# Patient Record
Sex: MALE | Race: WHITE | NOT HISPANIC OR LATINO | Employment: FULL TIME | URBAN - METROPOLITAN AREA
[De-identification: names, ages, dates, MRNs, and addresses within clinical notes are randomized per-mention and may not be internally consistent; named-entity substitution may affect disease eponyms.]

---

## 2019-09-04 ENCOUNTER — RECORDS - HEALTHEAST (OUTPATIENT)
Dept: LAB | Facility: CLINIC | Age: 49
End: 2019-09-04

## 2019-09-04 LAB
ALBUMIN SERPL-MCNC: 4 G/DL (ref 3.5–5)
ALP SERPL-CCNC: 83 U/L (ref 45–120)
ALT SERPL W P-5'-P-CCNC: 52 U/L (ref 0–45)
ANION GAP SERPL CALCULATED.3IONS-SCNC: 11 MMOL/L (ref 5–18)
AST SERPL W P-5'-P-CCNC: 36 U/L (ref 0–40)
BILIRUB SERPL-MCNC: 0.7 MG/DL (ref 0–1)
BUN SERPL-MCNC: 8 MG/DL (ref 8–22)
CALCIUM SERPL-MCNC: 9.4 MG/DL (ref 8.5–10.5)
CHLORIDE BLD-SCNC: 106 MMOL/L (ref 98–107)
CHOLEST SERPL-MCNC: 166 MG/DL
CO2 SERPL-SCNC: 27 MMOL/L (ref 22–31)
CREAT SERPL-MCNC: 0.92 MG/DL (ref 0.7–1.3)
FASTING STATUS PATIENT QL REPORTED: NORMAL
GFR SERPL CREATININE-BSD FRML MDRD: >60 ML/MIN/1.73M2
GLUCOSE BLD-MCNC: 160 MG/DL (ref 70–125)
HDLC SERPL-MCNC: 48 MG/DL
LDLC SERPL CALC-MCNC: 99 MG/DL
POTASSIUM BLD-SCNC: 4.1 MMOL/L (ref 3.5–5)
PROT SERPL-MCNC: 7.2 G/DL (ref 6–8)
SODIUM SERPL-SCNC: 144 MMOL/L (ref 136–145)
TRIGL SERPL-MCNC: 94 MG/DL

## 2020-04-10 ENCOUNTER — RECORDS - HEALTHEAST (OUTPATIENT)
Dept: LAB | Facility: CLINIC | Age: 50
End: 2020-04-10

## 2020-04-10 LAB
ALBUMIN SERPL-MCNC: 4 G/DL (ref 3.5–5)
ALP SERPL-CCNC: 89 U/L (ref 45–120)
ALT SERPL W P-5'-P-CCNC: 79 U/L (ref 0–45)
ANION GAP SERPL CALCULATED.3IONS-SCNC: 9 MMOL/L (ref 5–18)
AST SERPL W P-5'-P-CCNC: 63 U/L (ref 0–40)
BILIRUB SERPL-MCNC: 0.8 MG/DL (ref 0–1)
BUN SERPL-MCNC: 10 MG/DL (ref 8–22)
CALCIUM SERPL-MCNC: 9.4 MG/DL (ref 8.5–10.5)
CHLORIDE BLD-SCNC: 104 MMOL/L (ref 98–107)
CHOLEST SERPL-MCNC: 184 MG/DL
CO2 SERPL-SCNC: 30 MMOL/L (ref 22–31)
CREAT SERPL-MCNC: 0.86 MG/DL (ref 0.7–1.3)
FASTING STATUS PATIENT QL REPORTED: YES
GFR SERPL CREATININE-BSD FRML MDRD: >60 ML/MIN/1.73M2
GLUCOSE BLD-MCNC: 181 MG/DL (ref 70–125)
HDLC SERPL-MCNC: 54 MG/DL
LDLC SERPL CALC-MCNC: 108 MG/DL
POTASSIUM BLD-SCNC: 3.8 MMOL/L (ref 3.5–5)
PROT SERPL-MCNC: 7.5 G/DL (ref 6–8)
SODIUM SERPL-SCNC: 143 MMOL/L (ref 136–145)
TRIGL SERPL-MCNC: 112 MG/DL

## 2020-05-15 ENCOUNTER — RECORDS - HEALTHEAST (OUTPATIENT)
Dept: LAB | Facility: CLINIC | Age: 50
End: 2020-05-15

## 2020-05-15 LAB
ALBUMIN SERPL-MCNC: 4.2 G/DL (ref 3.5–5)
ANION GAP SERPL CALCULATED.3IONS-SCNC: 12 MMOL/L (ref 5–18)
BUN SERPL-MCNC: 11 MG/DL (ref 8–22)
CALCIUM SERPL-MCNC: 9.9 MG/DL (ref 8.5–10.5)
CHLORIDE BLD-SCNC: 103 MMOL/L (ref 98–107)
CO2 SERPL-SCNC: 28 MMOL/L (ref 22–31)
CREAT SERPL-MCNC: 1.01 MG/DL (ref 0.7–1.3)
GFR SERPL CREATININE-BSD FRML MDRD: >60 ML/MIN/1.73M2
GLUCOSE BLD-MCNC: 177 MG/DL (ref 70–125)
PHOSPHATE SERPL-MCNC: 3.1 MG/DL (ref 2.5–4.5)
POTASSIUM BLD-SCNC: 3.8 MMOL/L (ref 3.5–5)
SODIUM SERPL-SCNC: 143 MMOL/L (ref 136–145)

## 2020-05-16 LAB — BACTERIA SPEC CULT: NO GROWTH

## 2021-05-26 ENCOUNTER — RECORDS - HEALTHEAST (OUTPATIENT)
Dept: ADMINISTRATIVE | Facility: CLINIC | Age: 51
End: 2021-05-26

## 2021-08-27 ENCOUNTER — LAB REQUISITION (OUTPATIENT)
Dept: LAB | Facility: CLINIC | Age: 51
End: 2021-08-27

## 2021-08-27 DIAGNOSIS — I10 ESSENTIAL (PRIMARY) HYPERTENSION: ICD-10-CM

## 2021-08-27 DIAGNOSIS — E11.9 TYPE 2 DIABETES MELLITUS WITHOUT COMPLICATIONS (H): ICD-10-CM

## 2021-08-27 LAB
ALBUMIN SERPL-MCNC: 4.2 G/DL (ref 3.5–5)
ALP SERPL-CCNC: 97 U/L (ref 45–120)
ALT SERPL W P-5'-P-CCNC: 48 U/L (ref 0–45)
ANION GAP SERPL CALCULATED.3IONS-SCNC: 11 MMOL/L (ref 5–18)
AST SERPL W P-5'-P-CCNC: 36 U/L (ref 0–40)
BILIRUB SERPL-MCNC: 0.8 MG/DL (ref 0–1)
BUN SERPL-MCNC: 7 MG/DL (ref 8–22)
CALCIUM SERPL-MCNC: 10.2 MG/DL (ref 8.5–10.5)
CHLORIDE BLD-SCNC: 103 MMOL/L (ref 98–107)
CHOLEST SERPL-MCNC: 154 MG/DL
CO2 SERPL-SCNC: 29 MMOL/L (ref 22–31)
CREAT SERPL-MCNC: 0.92 MG/DL (ref 0.7–1.3)
GFR SERPL CREATININE-BSD FRML MDRD: >90 ML/MIN/1.73M2
GLUCOSE BLD-MCNC: 166 MG/DL (ref 70–125)
HDLC SERPL-MCNC: 50 MG/DL
LDLC SERPL CALC-MCNC: 62 MG/DL
POTASSIUM BLD-SCNC: 3.9 MMOL/L (ref 3.5–5)
PROT SERPL-MCNC: 7.7 G/DL (ref 6–8)
SODIUM SERPL-SCNC: 143 MMOL/L (ref 136–145)
TRIGL SERPL-MCNC: 208 MG/DL

## 2021-08-27 PROCEDURE — 80061 LIPID PANEL: CPT | Performed by: FAMILY MEDICINE

## 2021-08-27 PROCEDURE — 80053 COMPREHEN METABOLIC PANEL: CPT | Performed by: FAMILY MEDICINE

## 2022-02-18 ENCOUNTER — LAB REQUISITION (OUTPATIENT)
Dept: LAB | Facility: CLINIC | Age: 52
End: 2022-02-18

## 2022-02-18 DIAGNOSIS — Z12.5 ENCOUNTER FOR SCREENING FOR MALIGNANT NEOPLASM OF PROSTATE: ICD-10-CM

## 2022-02-18 DIAGNOSIS — I10 ESSENTIAL (PRIMARY) HYPERTENSION: ICD-10-CM

## 2022-02-18 LAB
ALBUMIN SERPL-MCNC: 4.3 G/DL (ref 3.5–5)
ALP SERPL-CCNC: 96 U/L (ref 45–120)
ALT SERPL W P-5'-P-CCNC: 55 U/L (ref 0–45)
ANION GAP SERPL CALCULATED.3IONS-SCNC: 12 MMOL/L (ref 5–18)
AST SERPL W P-5'-P-CCNC: 39 U/L (ref 0–40)
BILIRUB SERPL-MCNC: 0.9 MG/DL (ref 0–1)
BUN SERPL-MCNC: 8 MG/DL (ref 8–22)
CALCIUM SERPL-MCNC: 9.8 MG/DL (ref 8.5–10.5)
CHLORIDE BLD-SCNC: 104 MMOL/L (ref 98–107)
CO2 SERPL-SCNC: 26 MMOL/L (ref 22–31)
CREAT SERPL-MCNC: 0.83 MG/DL (ref 0.7–1.3)
GFR SERPL CREATININE-BSD FRML MDRD: >90 ML/MIN/1.73M2
GLUCOSE BLD-MCNC: 124 MG/DL (ref 70–125)
POTASSIUM BLD-SCNC: 3.6 MMOL/L (ref 3.5–5)
PROT SERPL-MCNC: 7.6 G/DL (ref 6–8)
SODIUM SERPL-SCNC: 142 MMOL/L (ref 136–145)

## 2022-02-18 PROCEDURE — G0103 PSA SCREENING: HCPCS | Performed by: FAMILY MEDICINE

## 2022-02-18 PROCEDURE — 80053 COMPREHEN METABOLIC PANEL: CPT | Performed by: FAMILY MEDICINE

## 2022-02-19 LAB — PSA SERPL-MCNC: 0.29 UG/L (ref 0–3.5)

## 2022-08-26 ENCOUNTER — LAB REQUISITION (OUTPATIENT)
Dept: LAB | Facility: CLINIC | Age: 52
End: 2022-08-26

## 2022-08-26 DIAGNOSIS — E11.9 TYPE 2 DIABETES MELLITUS WITHOUT COMPLICATIONS (H): ICD-10-CM

## 2022-08-26 DIAGNOSIS — I10 ESSENTIAL (PRIMARY) HYPERTENSION: ICD-10-CM

## 2022-08-26 LAB
ALBUMIN SERPL BCG-MCNC: 4.5 G/DL (ref 3.5–5.2)
ALP SERPL-CCNC: 92 U/L (ref 40–129)
ALT SERPL W P-5'-P-CCNC: 61 U/L (ref 10–50)
ANION GAP SERPL CALCULATED.3IONS-SCNC: 12 MMOL/L (ref 7–15)
AST SERPL W P-5'-P-CCNC: 57 U/L (ref 10–50)
BILIRUB SERPL-MCNC: 0.5 MG/DL
BUN SERPL-MCNC: 9 MG/DL (ref 6–20)
CALCIUM SERPL-MCNC: 10.3 MG/DL (ref 8.6–10)
CHLORIDE SERPL-SCNC: 101 MMOL/L (ref 98–107)
CHOLEST SERPL-MCNC: 136 MG/DL
CREAT SERPL-MCNC: 0.79 MG/DL (ref 0.67–1.17)
DEPRECATED HCO3 PLAS-SCNC: 28 MMOL/L (ref 22–29)
GFR SERPL CREATININE-BSD FRML MDRD: >90 ML/MIN/1.73M2
GLUCOSE SERPL-MCNC: 147 MG/DL (ref 70–99)
HDLC SERPL-MCNC: 45 MG/DL
LDLC SERPL CALC-MCNC: 45 MG/DL
NONHDLC SERPL-MCNC: 91 MG/DL
POTASSIUM SERPL-SCNC: 3.7 MMOL/L (ref 3.4–5.3)
PROT SERPL-MCNC: 7.3 G/DL (ref 6.4–8.3)
SODIUM SERPL-SCNC: 141 MMOL/L (ref 136–145)
TRIGL SERPL-MCNC: 228 MG/DL

## 2022-08-26 PROCEDURE — 80061 LIPID PANEL: CPT | Performed by: FAMILY MEDICINE

## 2022-08-26 PROCEDURE — 80053 COMPREHEN METABOLIC PANEL: CPT | Performed by: FAMILY MEDICINE

## 2022-09-30 ENCOUNTER — LAB REQUISITION (OUTPATIENT)
Dept: LAB | Facility: CLINIC | Age: 52
End: 2022-09-30

## 2022-09-30 DIAGNOSIS — I10 ESSENTIAL (PRIMARY) HYPERTENSION: ICD-10-CM

## 2022-09-30 LAB
ALBUMIN SERPL BCG-MCNC: 4.6 G/DL (ref 3.5–5.2)
ALP SERPL-CCNC: 88 U/L (ref 40–129)
ALT SERPL W P-5'-P-CCNC: 68 U/L (ref 10–50)
ANION GAP SERPL CALCULATED.3IONS-SCNC: 13 MMOL/L (ref 7–15)
AST SERPL W P-5'-P-CCNC: 51 U/L (ref 10–50)
BILIRUB SERPL-MCNC: 0.7 MG/DL
BUN SERPL-MCNC: 11.4 MG/DL (ref 6–20)
CALCIUM SERPL-MCNC: 10.3 MG/DL (ref 8.6–10)
CHLORIDE SERPL-SCNC: 101 MMOL/L (ref 98–107)
CREAT SERPL-MCNC: 0.89 MG/DL (ref 0.67–1.17)
DEPRECATED HCO3 PLAS-SCNC: 28 MMOL/L (ref 22–29)
GFR SERPL CREATININE-BSD FRML MDRD: >90 ML/MIN/1.73M2
GLUCOSE SERPL-MCNC: 111 MG/DL (ref 70–99)
POTASSIUM SERPL-SCNC: 3.5 MMOL/L (ref 3.4–5.3)
PROT SERPL-MCNC: 7.4 G/DL (ref 6.4–8.3)
SODIUM SERPL-SCNC: 142 MMOL/L (ref 136–145)

## 2022-09-30 PROCEDURE — 80053 COMPREHEN METABOLIC PANEL: CPT | Performed by: FAMILY MEDICINE

## 2023-07-14 ENCOUNTER — LAB REQUISITION (OUTPATIENT)
Dept: LAB | Facility: CLINIC | Age: 53
End: 2023-07-14

## 2023-07-14 DIAGNOSIS — E11.65 TYPE 2 DIABETES MELLITUS WITH HYPERGLYCEMIA (H): ICD-10-CM

## 2023-07-14 DIAGNOSIS — I10 ESSENTIAL (PRIMARY) HYPERTENSION: ICD-10-CM

## 2023-07-14 LAB
ALBUMIN SERPL BCG-MCNC: 4.6 G/DL (ref 3.5–5.2)
ALP SERPL-CCNC: 98 U/L (ref 40–129)
ALT SERPL W P-5'-P-CCNC: 68 U/L (ref 0–70)
ANION GAP SERPL CALCULATED.3IONS-SCNC: 14 MMOL/L (ref 7–15)
AST SERPL W P-5'-P-CCNC: 59 U/L (ref 0–45)
BILIRUB SERPL-MCNC: 0.6 MG/DL
BUN SERPL-MCNC: 11.2 MG/DL (ref 6–20)
CALCIUM SERPL-MCNC: 10.1 MG/DL (ref 8.6–10)
CHLORIDE SERPL-SCNC: 102 MMOL/L (ref 98–107)
CHOLEST SERPL-MCNC: 157 MG/DL
CREAT SERPL-MCNC: 0.93 MG/DL (ref 0.67–1.17)
CREAT UR-MCNC: 190 MG/DL
DEPRECATED HCO3 PLAS-SCNC: 27 MMOL/L (ref 22–29)
GFR SERPL CREATININE-BSD FRML MDRD: >90 ML/MIN/1.73M2
GLUCOSE SERPL-MCNC: 144 MG/DL (ref 70–99)
HDLC SERPL-MCNC: 50 MG/DL
LDLC SERPL CALC-MCNC: 63 MG/DL
MICROALBUMIN UR-MCNC: 58 MG/L
MICROALBUMIN/CREAT UR: 30.53 MG/G CR (ref 0–17)
NONHDLC SERPL-MCNC: 107 MG/DL
POTASSIUM SERPL-SCNC: 3.6 MMOL/L (ref 3.4–5.3)
PROT SERPL-MCNC: 7.6 G/DL (ref 6.4–8.3)
SODIUM SERPL-SCNC: 143 MMOL/L (ref 136–145)
TRIGL SERPL-MCNC: 218 MG/DL

## 2023-07-14 PROCEDURE — 80053 COMPREHEN METABOLIC PANEL: CPT | Performed by: STUDENT IN AN ORGANIZED HEALTH CARE EDUCATION/TRAINING PROGRAM

## 2023-07-14 PROCEDURE — 80061 LIPID PANEL: CPT | Performed by: STUDENT IN AN ORGANIZED HEALTH CARE EDUCATION/TRAINING PROGRAM

## 2023-07-14 PROCEDURE — 82570 ASSAY OF URINE CREATININE: CPT | Performed by: STUDENT IN AN ORGANIZED HEALTH CARE EDUCATION/TRAINING PROGRAM

## 2024-01-12 ENCOUNTER — APPOINTMENT (OUTPATIENT)
Dept: CT IMAGING | Facility: HOSPITAL | Age: 54
End: 2024-01-12
Attending: STUDENT IN AN ORGANIZED HEALTH CARE EDUCATION/TRAINING PROGRAM
Payer: COMMERCIAL

## 2024-01-12 ENCOUNTER — HOSPITAL ENCOUNTER (INPATIENT)
Facility: HOSPITAL | Age: 54
LOS: 1 days | Discharge: HOME | End: 2024-01-13
Attending: STUDENT IN AN ORGANIZED HEALTH CARE EDUCATION/TRAINING PROGRAM | Admitting: INTERNAL MEDICINE
Payer: COMMERCIAL

## 2024-01-12 ENCOUNTER — TRANSFERRED RECORDS (OUTPATIENT)
Dept: HEALTH INFORMATION MANAGEMENT | Facility: CLINIC | Age: 54
End: 2024-01-12

## 2024-01-12 DIAGNOSIS — I10 BENIGN ESSENTIAL HYPERTENSION: ICD-10-CM

## 2024-01-12 DIAGNOSIS — E11.9 TYPE 2 DIABETES MELLITUS WITHOUT COMPLICATION, WITHOUT LONG-TERM CURRENT USE OF INSULIN (H): Primary | ICD-10-CM

## 2024-01-12 DIAGNOSIS — G47.33 OSA (OBSTRUCTIVE SLEEP APNEA): ICD-10-CM

## 2024-01-12 DIAGNOSIS — G45.9 TIA (TRANSIENT ISCHEMIC ATTACK): ICD-10-CM

## 2024-01-12 DIAGNOSIS — R29.90 STROKE-LIKE SYMPTOMS: ICD-10-CM

## 2024-01-12 PROBLEM — K76.0 FATTY LIVER: Status: ACTIVE | Noted: 2024-01-12

## 2024-01-12 PROBLEM — E66.9 OBESITY: Status: ACTIVE | Noted: 2024-01-12

## 2024-01-12 PROBLEM — E78.5 HYPERLIPIDEMIA LDL GOAL <70: Status: ACTIVE | Noted: 2024-01-12

## 2024-01-12 LAB
ANION GAP SERPL CALCULATED.3IONS-SCNC: 10 MMOL/L (ref 7–15)
APTT PPP: 29 SECONDS (ref 22–38)
BASOPHILS # BLD AUTO: 0.1 10E3/UL (ref 0–0.2)
BASOPHILS NFR BLD AUTO: 1 %
BUN SERPL-MCNC: 11.4 MG/DL (ref 6–20)
CALCIUM SERPL-MCNC: 9.8 MG/DL (ref 8.6–10)
CHLORIDE SERPL-SCNC: 103 MMOL/L (ref 98–107)
CHOLEST SERPL-MCNC: 157 MG/DL
CREAT SERPL-MCNC: 0.8 MG/DL (ref 0.67–1.17)
DEPRECATED HCO3 PLAS-SCNC: 28 MMOL/L (ref 22–29)
EGFRCR SERPLBLD CKD-EPI 2021: >90 ML/MIN/1.73M2
EOSINOPHIL # BLD AUTO: 0.2 10E3/UL (ref 0–0.7)
EOSINOPHIL NFR BLD AUTO: 2 %
ERYTHROCYTE [DISTWIDTH] IN BLOOD BY AUTOMATED COUNT: 13 % (ref 10–15)
GLUCOSE BLDC GLUCOMTR-MCNC: 198 MG/DL (ref 70–99)
GLUCOSE BLDC GLUCOMTR-MCNC: 226 MG/DL (ref 70–99)
GLUCOSE SERPL-MCNC: 246 MG/DL (ref 70–99)
HBA1C MFR BLD: 8.3 %
HCT VFR BLD AUTO: 46.7 % (ref 40–53)
HDLC SERPL-MCNC: 47 MG/DL
HGB BLD-MCNC: 16.4 G/DL (ref 13.3–17.7)
HOLD SPECIMEN: NORMAL
IMM GRANULOCYTES # BLD: 0 10E3/UL
IMM GRANULOCYTES NFR BLD: 0 %
INR PPP: 0.97 (ref 0.85–1.15)
LDLC SERPL CALC-MCNC: 56 MG/DL
LYMPHOCYTES # BLD AUTO: 2.7 10E3/UL (ref 0.8–5.3)
LYMPHOCYTES NFR BLD AUTO: 29 %
MCH RBC QN AUTO: 31.4 PG (ref 26.5–33)
MCHC RBC AUTO-ENTMCNC: 35.1 G/DL (ref 31.5–36.5)
MCV RBC AUTO: 90 FL (ref 78–100)
MONOCYTES # BLD AUTO: 0.6 10E3/UL (ref 0–1.3)
MONOCYTES NFR BLD AUTO: 7 %
NEUTROPHILS # BLD AUTO: 5.8 10E3/UL (ref 1.6–8.3)
NEUTROPHILS NFR BLD AUTO: 61 %
NONHDLC SERPL-MCNC: 110 MG/DL
NRBC # BLD AUTO: 0 10E3/UL
NRBC BLD AUTO-RTO: 0 /100
PLATELET # BLD AUTO: 208 10E3/UL (ref 150–450)
POTASSIUM SERPL-SCNC: 3.5 MMOL/L (ref 3.4–5.3)
RBC # BLD AUTO: 5.22 10E6/UL (ref 4.4–5.9)
SODIUM SERPL-SCNC: 141 MMOL/L (ref 135–145)
TRIGL SERPL-MCNC: 270 MG/DL
TROPONIN T SERPL HS-MCNC: 12 NG/L
WBC # BLD AUTO: 9.5 10E3/UL (ref 4–11)

## 2024-01-12 PROCEDURE — 0042T CT HEAD PERFUSION W CONTRAST: CPT

## 2024-01-12 PROCEDURE — 85025 COMPLETE CBC W/AUTO DIFF WBC: CPT | Performed by: STUDENT IN AN ORGANIZED HEALTH CARE EDUCATION/TRAINING PROGRAM

## 2024-01-12 PROCEDURE — G0378 HOSPITAL OBSERVATION PER HR: HCPCS

## 2024-01-12 PROCEDURE — 80048 BASIC METABOLIC PNL TOTAL CA: CPT | Performed by: STUDENT IN AN ORGANIZED HEALTH CARE EDUCATION/TRAINING PROGRAM

## 2024-01-12 PROCEDURE — 83036 HEMOGLOBIN GLYCOSYLATED A1C: CPT | Performed by: STUDENT IN AN ORGANIZED HEALTH CARE EDUCATION/TRAINING PROGRAM

## 2024-01-12 PROCEDURE — 80061 LIPID PANEL: CPT | Performed by: INTERNAL MEDICINE

## 2024-01-12 PROCEDURE — 85730 THROMBOPLASTIN TIME PARTIAL: CPT | Performed by: STUDENT IN AN ORGANIZED HEALTH CARE EDUCATION/TRAINING PROGRAM

## 2024-01-12 PROCEDURE — 120N000001 HC R&B MED SURG/OB

## 2024-01-12 PROCEDURE — 82962 GLUCOSE BLOOD TEST: CPT

## 2024-01-12 PROCEDURE — 85610 PROTHROMBIN TIME: CPT | Performed by: STUDENT IN AN ORGANIZED HEALTH CARE EDUCATION/TRAINING PROGRAM

## 2024-01-12 PROCEDURE — 84484 ASSAY OF TROPONIN QUANT: CPT | Performed by: STUDENT IN AN ORGANIZED HEALTH CARE EDUCATION/TRAINING PROGRAM

## 2024-01-12 PROCEDURE — 93005 ELECTROCARDIOGRAM TRACING: CPT | Performed by: STUDENT IN AN ORGANIZED HEALTH CARE EDUCATION/TRAINING PROGRAM

## 2024-01-12 PROCEDURE — 99223 1ST HOSP IP/OBS HIGH 75: CPT | Performed by: INTERNAL MEDICINE

## 2024-01-12 PROCEDURE — G0379 DIRECT REFER HOSPITAL OBSERV: HCPCS

## 2024-01-12 PROCEDURE — 70496 CT ANGIOGRAPHY HEAD: CPT

## 2024-01-12 PROCEDURE — 250N000013 HC RX MED GY IP 250 OP 250 PS 637: Performed by: STUDENT IN AN ORGANIZED HEALTH CARE EDUCATION/TRAINING PROGRAM

## 2024-01-12 PROCEDURE — 36415 COLL VENOUS BLD VENIPUNCTURE: CPT | Performed by: STUDENT IN AN ORGANIZED HEALTH CARE EDUCATION/TRAINING PROGRAM

## 2024-01-12 PROCEDURE — 250N000011 HC RX IP 250 OP 636: Performed by: STUDENT IN AN ORGANIZED HEALTH CARE EDUCATION/TRAINING PROGRAM

## 2024-01-12 RX ORDER — ASPIRIN 81 MG/1
81 TABLET ORAL DAILY
Status: DISCONTINUED | OUTPATIENT
Start: 2024-01-12 | End: 2024-01-13 | Stop reason: HOSPADM

## 2024-01-12 RX ORDER — ATORVASTATIN CALCIUM 10 MG/1
10 TABLET, FILM COATED ORAL EVERY MORNING
COMMUNITY
Start: 2023-08-21

## 2024-01-12 RX ORDER — METFORMIN HCL 500 MG
1000 TABLET, EXTENDED RELEASE 24 HR ORAL 2 TIMES DAILY WITH MEALS
COMMUNITY
Start: 2023-12-27

## 2024-01-12 RX ORDER — ONDANSETRON 4 MG/1
4 TABLET, ORALLY DISINTEGRATING ORAL EVERY 6 HOURS PRN
Status: DISCONTINUED | OUTPATIENT
Start: 2024-01-12 | End: 2024-01-13 | Stop reason: HOSPADM

## 2024-01-12 RX ORDER — LOSARTAN POTASSIUM 100 MG/1
100 TABLET ORAL EVERY MORNING
COMMUNITY
Start: 2023-12-27

## 2024-01-12 RX ORDER — IOPAMIDOL 755 MG/ML
50 INJECTION, SOLUTION INTRAVASCULAR ONCE
Status: COMPLETED | OUTPATIENT
Start: 2024-01-12 | End: 2024-01-12

## 2024-01-12 RX ORDER — ASPIRIN 81 MG/1
81 TABLET, CHEWABLE ORAL DAILY
Status: DISCONTINUED | OUTPATIENT
Start: 2024-01-12 | End: 2024-01-13 | Stop reason: HOSPADM

## 2024-01-12 RX ORDER — IBUPROFEN 200 MG
200 TABLET ORAL EVERY 8 HOURS PRN
Status: ON HOLD | COMMUNITY
End: 2024-01-13

## 2024-01-12 RX ORDER — DEXTROSE MONOHYDRATE 25 G/50ML
25-50 INJECTION, SOLUTION INTRAVENOUS
Status: DISCONTINUED | OUTPATIENT
Start: 2024-01-12 | End: 2024-01-13 | Stop reason: HOSPADM

## 2024-01-12 RX ORDER — ONDANSETRON 2 MG/ML
4 INJECTION INTRAMUSCULAR; INTRAVENOUS EVERY 6 HOURS PRN
Status: DISCONTINUED | OUTPATIENT
Start: 2024-01-12 | End: 2024-01-13 | Stop reason: HOSPADM

## 2024-01-12 RX ORDER — SODIUM CHLORIDE 9 MG/ML
INJECTION, SOLUTION INTRAVENOUS CONTINUOUS
Status: DISCONTINUED | OUTPATIENT
Start: 2024-01-12 | End: 2024-01-13

## 2024-01-12 RX ORDER — ASPIRIN 81 MG/1
81 TABLET, CHEWABLE ORAL DAILY
Status: DISCONTINUED | OUTPATIENT
Start: 2024-01-13 | End: 2024-01-12

## 2024-01-12 RX ORDER — ATORVASTATIN CALCIUM 10 MG/1
10 TABLET, FILM COATED ORAL EVERY MORNING
Status: DISCONTINUED | OUTPATIENT
Start: 2024-01-13 | End: 2024-01-13 | Stop reason: HOSPADM

## 2024-01-12 RX ORDER — CLOPIDOGREL BISULFATE 75 MG/1
75 TABLET ORAL DAILY
Status: DISCONTINUED | OUTPATIENT
Start: 2024-01-13 | End: 2024-01-13 | Stop reason: HOSPADM

## 2024-01-12 RX ORDER — NICOTINE POLACRILEX 4 MG
15-30 LOZENGE BUCCAL
Status: DISCONTINUED | OUTPATIENT
Start: 2024-01-12 | End: 2024-01-13 | Stop reason: HOSPADM

## 2024-01-12 RX ORDER — HYDROCHLOROTHIAZIDE 25 MG/1
25 TABLET ORAL EVERY MORNING
COMMUNITY
Start: 2023-07-14

## 2024-01-12 RX ORDER — CLOPIDOGREL BISULFATE 75 MG/1
300 TABLET ORAL ONCE
Status: COMPLETED | OUTPATIENT
Start: 2024-01-12 | End: 2024-01-12

## 2024-01-12 RX ORDER — LIDOCAINE 40 MG/G
CREAM TOPICAL
Status: DISCONTINUED | OUTPATIENT
Start: 2024-01-12 | End: 2024-01-13 | Stop reason: HOSPADM

## 2024-01-12 RX ORDER — IOPAMIDOL 755 MG/ML
75 INJECTION, SOLUTION INTRAVASCULAR ONCE
Status: COMPLETED | OUTPATIENT
Start: 2024-01-12 | End: 2024-01-12

## 2024-01-12 RX ADMIN — IOPAMIDOL 75 ML: 755 INJECTION, SOLUTION INTRAVENOUS at 21:51

## 2024-01-12 RX ADMIN — IOPAMIDOL 50 ML: 755 INJECTION, SOLUTION INTRAVENOUS at 21:53

## 2024-01-12 RX ADMIN — CLOPIDOGREL BISULFATE 300 MG: 75 TABLET ORAL at 21:54

## 2024-01-12 ASSESSMENT — ACTIVITIES OF DAILY LIVING (ADL): ADLS_ACUITY_SCORE: 35

## 2024-01-13 ENCOUNTER — APPOINTMENT (OUTPATIENT)
Dept: PHYSICAL THERAPY | Facility: HOSPITAL | Age: 54
End: 2024-01-13
Attending: INTERNAL MEDICINE
Payer: COMMERCIAL

## 2024-01-13 ENCOUNTER — APPOINTMENT (OUTPATIENT)
Dept: CARDIOLOGY | Facility: HOSPITAL | Age: 54
End: 2024-01-13
Attending: INTERNAL MEDICINE
Payer: COMMERCIAL

## 2024-01-13 ENCOUNTER — APPOINTMENT (OUTPATIENT)
Dept: MRI IMAGING | Facility: HOSPITAL | Age: 54
End: 2024-01-13
Attending: INTERNAL MEDICINE
Payer: COMMERCIAL

## 2024-01-13 VITALS
SYSTOLIC BLOOD PRESSURE: 145 MMHG | HEART RATE: 104 BPM | BODY MASS INDEX: 38.51 KG/M2 | RESPIRATION RATE: 18 BRPM | DIASTOLIC BLOOD PRESSURE: 81 MMHG | WEIGHT: 290.57 LBS | TEMPERATURE: 98.3 F | OXYGEN SATURATION: 95 % | HEIGHT: 73 IN

## 2024-01-13 LAB
ANION GAP SERPL CALCULATED.3IONS-SCNC: 14 MMOL/L (ref 7–15)
ATRIAL RATE - MUSE: 82 BPM
BUN SERPL-MCNC: 9.8 MG/DL (ref 6–20)
CALCIUM SERPL-MCNC: 9.1 MG/DL (ref 8.6–10)
CHLORIDE SERPL-SCNC: 104 MMOL/L (ref 98–107)
CREAT SERPL-MCNC: 0.68 MG/DL (ref 0.67–1.17)
DEPRECATED HCO3 PLAS-SCNC: 23 MMOL/L (ref 22–29)
DIASTOLIC BLOOD PRESSURE - MUSE: 83 MMHG
EGFRCR SERPLBLD CKD-EPI 2021: >90 ML/MIN/1.73M2
ERYTHROCYTE [DISTWIDTH] IN BLOOD BY AUTOMATED COUNT: 12.8 % (ref 10–15)
GLUCOSE BLDC GLUCOMTR-MCNC: 162 MG/DL (ref 70–99)
GLUCOSE BLDC GLUCOMTR-MCNC: 174 MG/DL (ref 70–99)
GLUCOSE BLDC GLUCOMTR-MCNC: 195 MG/DL (ref 70–99)
GLUCOSE BLDC GLUCOMTR-MCNC: 232 MG/DL (ref 70–99)
GLUCOSE BLDC GLUCOMTR-MCNC: 253 MG/DL (ref 70–99)
GLUCOSE SERPL-MCNC: 195 MG/DL (ref 70–99)
HCT VFR BLD AUTO: 43.7 % (ref 40–53)
HGB BLD-MCNC: 15 G/DL (ref 13.3–17.7)
INTERPRETATION ECG - MUSE: NORMAL
LVEF ECHO: NORMAL
MAGNESIUM SERPL-MCNC: 1.8 MG/DL (ref 1.7–2.3)
MCH RBC QN AUTO: 30.7 PG (ref 26.5–33)
MCHC RBC AUTO-ENTMCNC: 34.3 G/DL (ref 31.5–36.5)
MCV RBC AUTO: 89 FL (ref 78–100)
P AXIS - MUSE: 65 DEGREES
PLATELET # BLD AUTO: 170 10E3/UL (ref 150–450)
POTASSIUM SERPL-SCNC: 3.3 MMOL/L (ref 3.4–5.3)
PR INTERVAL - MUSE: 174 MS
QRS DURATION - MUSE: 114 MS
QT - MUSE: 398 MS
QTC - MUSE: 464 MS
R AXIS - MUSE: 113 DEGREES
RBC # BLD AUTO: 4.89 10E6/UL (ref 4.4–5.9)
SODIUM SERPL-SCNC: 141 MMOL/L (ref 135–145)
SYSTOLIC BLOOD PRESSURE - MUSE: 164 MMHG
T AXIS - MUSE: -14 DEGREES
TROPONIN T SERPL HS-MCNC: 13 NG/L
VENTRICULAR RATE- MUSE: 82 BPM
WBC # BLD AUTO: 11.1 10E3/UL (ref 4–11)

## 2024-01-13 PROCEDURE — 83735 ASSAY OF MAGNESIUM: CPT | Performed by: HOSPITALIST

## 2024-01-13 PROCEDURE — 250N000013 HC RX MED GY IP 250 OP 250 PS 637: Performed by: HOSPITALIST

## 2024-01-13 PROCEDURE — G0378 HOSPITAL OBSERVATION PER HR: HCPCS

## 2024-01-13 PROCEDURE — 70551 MRI BRAIN STEM W/O DYE: CPT

## 2024-01-13 PROCEDURE — 999N000226 HC STATISTIC SLP IP EVAL DEFER

## 2024-01-13 PROCEDURE — 99239 HOSP IP/OBS DSCHRG MGMT >30: CPT | Performed by: HOSPITALIST

## 2024-01-13 PROCEDURE — 93306 TTE W/DOPPLER COMPLETE: CPT | Mod: 26 | Performed by: INTERNAL MEDICINE

## 2024-01-13 PROCEDURE — 36415 COLL VENOUS BLD VENIPUNCTURE: CPT | Performed by: INTERNAL MEDICINE

## 2024-01-13 PROCEDURE — 96361 HYDRATE IV INFUSION ADD-ON: CPT

## 2024-01-13 PROCEDURE — 85027 COMPLETE CBC AUTOMATED: CPT | Performed by: INTERNAL MEDICINE

## 2024-01-13 PROCEDURE — 84484 ASSAY OF TROPONIN QUANT: CPT | Performed by: INTERNAL MEDICINE

## 2024-01-13 PROCEDURE — 255N000002 HC RX 255 OP 636: Performed by: HOSPITALIST

## 2024-01-13 PROCEDURE — 80048 BASIC METABOLIC PNL TOTAL CA: CPT | Performed by: INTERNAL MEDICINE

## 2024-01-13 PROCEDURE — 250N000013 HC RX MED GY IP 250 OP 250 PS 637: Performed by: INTERNAL MEDICINE

## 2024-01-13 PROCEDURE — 250N000012 HC RX MED GY IP 250 OP 636 PS 637: Performed by: INTERNAL MEDICINE

## 2024-01-13 PROCEDURE — 96360 HYDRATION IV INFUSION INIT: CPT | Mod: 59

## 2024-01-13 PROCEDURE — 999N000104 HC STATISTIC NO CHARGE

## 2024-01-13 PROCEDURE — 258N000003 HC RX IP 258 OP 636: Performed by: INTERNAL MEDICINE

## 2024-01-13 RX ORDER — GLIPIZIDE 5 MG/1
5 TABLET ORAL
Qty: 30 TABLET | Refills: 3 | Status: SHIPPED | OUTPATIENT
Start: 2024-01-14 | End: 2024-01-13

## 2024-01-13 RX ORDER — ASPIRIN 81 MG/1
81 TABLET ORAL DAILY
Qty: 30 TABLET | Refills: 9 | Status: SHIPPED | OUTPATIENT
Start: 2024-01-14 | End: 2024-01-13

## 2024-01-13 RX ORDER — LOSARTAN POTASSIUM 50 MG/1
100 TABLET ORAL EVERY MORNING
Status: DISCONTINUED | OUTPATIENT
Start: 2024-01-13 | End: 2024-01-13 | Stop reason: HOSPADM

## 2024-01-13 RX ORDER — CLOPIDOGREL BISULFATE 75 MG/1
75 TABLET ORAL DAILY
Qty: 21 TABLET | Refills: 0 | Status: SHIPPED | OUTPATIENT
Start: 2024-01-14

## 2024-01-13 RX ORDER — CARVEDILOL 3.12 MG/1
3.12 TABLET ORAL 2 TIMES DAILY WITH MEALS
Qty: 60 TABLET | Refills: 1 | Status: SHIPPED | OUTPATIENT
Start: 2024-01-19

## 2024-01-13 RX ORDER — CLOPIDOGREL BISULFATE 75 MG/1
75 TABLET ORAL DAILY
Qty: 21 TABLET | Refills: 0 | Status: SHIPPED | OUTPATIENT
Start: 2024-01-14 | End: 2024-01-13

## 2024-01-13 RX ORDER — METFORMIN HCL 500 MG
1000 TABLET, EXTENDED RELEASE 24 HR ORAL 2 TIMES DAILY WITH MEALS
Status: DISCONTINUED | OUTPATIENT
Start: 2024-01-13 | End: 2024-01-13 | Stop reason: HOSPADM

## 2024-01-13 RX ORDER — LORAZEPAM 0.5 MG/1
0.25 TABLET ORAL ONCE
Status: COMPLETED | OUTPATIENT
Start: 2024-01-13 | End: 2024-01-13

## 2024-01-13 RX ORDER — ASPIRIN 81 MG/1
81 TABLET ORAL DAILY
Qty: 30 TABLET | Refills: 9 | Status: SHIPPED | OUTPATIENT
Start: 2024-01-14

## 2024-01-13 RX ORDER — GLIPIZIDE 5 MG/1
5 TABLET ORAL
Qty: 30 TABLET | Refills: 3 | Status: SHIPPED | OUTPATIENT
Start: 2024-01-14

## 2024-01-13 RX ORDER — GLIPIZIDE 5 MG/1
5 TABLET ORAL
Status: DISCONTINUED | OUTPATIENT
Start: 2024-01-13 | End: 2024-01-13 | Stop reason: HOSPADM

## 2024-01-13 RX ORDER — HYDROCHLOROTHIAZIDE 25 MG/1
25 TABLET ORAL EVERY MORNING
Status: DISCONTINUED | OUTPATIENT
Start: 2024-01-13 | End: 2024-01-13 | Stop reason: HOSPADM

## 2024-01-13 RX ADMIN — CLOPIDOGREL BISULFATE 75 MG: 75 TABLET ORAL at 09:46

## 2024-01-13 RX ADMIN — INSULIN ASPART 2 UNITS: 100 INJECTION, SOLUTION INTRAVENOUS; SUBCUTANEOUS at 12:07

## 2024-01-13 RX ADMIN — ATORVASTATIN CALCIUM 10 MG: 10 TABLET, FILM COATED ORAL at 09:45

## 2024-01-13 RX ADMIN — GLIPIZIDE 5 MG: 5 TABLET ORAL at 12:12

## 2024-01-13 RX ADMIN — ASPIRIN 81 MG CHEWABLE TABLET 81 MG: 81 TABLET CHEWABLE at 00:05

## 2024-01-13 RX ADMIN — INSULIN ASPART 1 UNITS: 100 INJECTION, SOLUTION INTRAVENOUS; SUBCUTANEOUS at 09:53

## 2024-01-13 RX ADMIN — PERFLUTREN 1.5 ML: 6.52 INJECTION, SUSPENSION INTRAVENOUS at 11:15

## 2024-01-13 RX ADMIN — LOSARTAN POTASSIUM 100 MG: 50 TABLET, FILM COATED ORAL at 12:07

## 2024-01-13 RX ADMIN — HYDROCHLOROTHIAZIDE 25 MG: 25 TABLET ORAL at 12:07

## 2024-01-13 RX ADMIN — Medication 81 MG: at 09:45

## 2024-01-13 RX ADMIN — LORAZEPAM 0.25 MG: 0.5 TABLET ORAL at 07:41

## 2024-01-13 RX ADMIN — SODIUM CHLORIDE: 9 INJECTION, SOLUTION INTRAVENOUS at 00:11

## 2024-01-13 ASSESSMENT — ACTIVITIES OF DAILY LIVING (ADL)
ADLS_ACUITY_SCORE: 22
ADLS_ACUITY_SCORE: 35
ADLS_ACUITY_SCORE: 22

## 2024-01-13 NOTE — PLAN OF CARE
Occupational Therapy Discharge Summary    Occupational Therapy: Orders received. Chart reviewed and discussed with care team.? Occupational Therapy not indicated at this time per PT report no skilled OT needs.? Defer discharge recommendations to interdisciplinary team.? Will complete orders.     AMANDA Rodriguez, OTR/L, 1/13/2024, 9:56 AM

## 2024-01-13 NOTE — ED NOTES
Expected Patient Referral to ED  8:43 PM    Referring Clinic/Provider:  URGENCY ROOM    Reason for referral/Clinical facts:  Patient presented with peripheral vision loss and left-sided weakness.  CT head normal.  No CTA.      Last known normal was 630.    Recommendations provided:  Send to ED for further evaluation    Caller was informed that this institution does possess the capabilities and/or resources to provide for patient and should be transferred to our facility.    Discussed that if direct admit is sought and any hurdles are encountered, this ED would be happy to see the patient and evaluate.    Informed caller that recommendations provided are recommendations based only on the facts provided and that they responsible to accept or reject the advice, or to seek a formal in person consultation as needed and that this ED will see/treat patient should they arrive.      Kameron Julian DO  [facility] EMERGENCY DEPARTMENT  [address] [place] [id number]  [phone]       Kameron Julian DO  01/12/24 2044

## 2024-01-13 NOTE — ED NOTES
"[facility] ED Handoff Report    ED Chief Complaint: blurred vision/weakness     ED Diagnosis:  (G45.9) TIA (transient ischemic attack)    (R29.90) Stroke-like symptoms    PMH:  History reviewed. No pertinent past medical history.     Code Status:  Full Code     Falls Risk: No Band: Not applicable    Current Living Situation/Residence: lives with a significant other     Elimination Status: Continent: Yes     Activity Level: Independent    Patients Preferred Language:  English     Needed: No    Vital Signs:  BP (!) 155/74   Pulse 72   Resp 22   Ht 1.854 m (6' 1\")   Wt 131.5 kg (289 lb 14.5 oz)   SpO2 95%   BMI 38.25 kg/m       Cardiac Rhythm: NSR    Pain Score: 0/10    Is the Patient Confused:  No    Last Food or Drink: 01/13/24 at 0005    Tests Performed: Done: Labs and Imaging    Family Dynamics/Concerns: No    Belongings Sent with Patient: yes      Covid: asymptomatic , negative      RN: Jayce Acosta RN 1/13/2024 1:30 AM        "

## 2024-01-13 NOTE — H&P
"[facility]    History and Physical - Hospitalist Service       Date of Admission:  1/12/2024    Assessment & Plan   Douglas Stephenson is a 53 year old male w with a medical history significant for hypertension, hyperlipidemia, type II DM, morbid obesity, fatty liver and other medical comorbidities who is admitted with stroke symptoms concerning for TIA.  He specifically presented with 20 minutes of left-sided weakness and bitemporal vision loss    Patient Active Problem List   Diagnosis    TIA (transient ischemic attack)    Benign essential hypertension    Hyperlipidemia LDL goal <70    Obesity    Fatty liver    Type 2 diabetes mellitus (H)      Acute TIA-admitted for stroke risk stratification.  Placed on the stroke protocol.  Consult neurology, PT, OT, speech therapy.  Continue aspirin, statin, Plavix.  Appreciate neurology input.    Hypertension permissive hypertension-hold blood pressure medication for now.  Permissive hypertension.  Gentle hydration.    Hyperlipidemia-resume statin.  LDL goal less than 100     Type II UU-Eipo-Prkfy, sliding scale insulin,    Fatty liver-weight loss should help.  Outpatient follow-up    Obesity-outpatient follow-up    Rest of patient's medical problems management remains unchanged     Diet: NPO for Medical/Clinical Reasons Except for: No Exceptions    DVT Prophylaxis: Pneumatic Compression Devices  Harp Catheter: Not present  Lines: None     Cardiac Monitoring: None  Code Status:  Full code    Clinically Significant Risk Factors Present on Admission                       # Obesity: Estimated body mass index is 38.25 kg/m  as calculated from the following:    Height as of this encounter: 1.854 m (6' 1\").    Weight as of this encounter: 131.5 kg (289 lb 14.5 oz).              Disposition Plan           Susana Bautista MD  Hospitalist Service  [facility]  Securely message with [employer] (more info)  Text page via [facility] Paging/Directory "     ______________________________________________________________________    Chief Complaint   Stroke symptoms        History of Present Illness   Douglas Stephenson is a 53 year old male w with a medical history significant for hypertension, hyperlipidemia, type II DM, morbid obesity, fatty liver and other medical comorbidities who is admitted with stroke symptoms concerning for TIA.  He specifically presented with 20 minutes of left-sided weakness and bitemporal vision loss.  Patient had no symptoms when I saw him on admission.      Patient reportedly presented to the ER with peripheral vision loss and left sided weakness. he was last known to be normal at 630.  Stroke code was called at 9:23 PM.  Per report patient was sitting in a chair when this happened.  Per documentation at 928 all this had resolved already in the ER.    He woke up at 7 om from a nap with above symptoms and lasted abut 10min. Symptoms had resolved by the time he got to the ER. A stroke code was called. He had a CT w/o, contrast, CTA, CTA neck, case was discussed with neurology who recommedned loading with plavix 300mg and continuing wih ASA and plavix. Neuro also recommended BP goal 180-200s.     Preliminary workup done in the ER included a CT head without contrast which did not show any acute ischemic changes.  CTA head and neck showed results below:    Narrative & Impression   EXAM: CTA HEAD NECK W CONTRAST  LOCATION: [facility]  DATE/TIME: 1/12/2024 9:51 PM CST     INDICATION: Code stroke; blurred vision, left arm weakness  COMPARISON: Same day CT head 2037 hours  CONTRAST: 75 mL Isovue 370  TECHNIQUE: Head and neck CT angiogram with IV contrast. Noncontrast head CT followed by axial helical CT images of the head and neck vessels obtained during the arterial phase of intravenous contrast administration. Axial 2D reconstructed images and   multiplanar 3D MIP reconstructed images of the head and neck vessels were  performed by the technologist. Dose reduction techniques were used. All stenosis measurements made according to NASCET criteria unless otherwise specified.     FINDINGS:   NONCONTRAST HEAD CT:   INTRACRANIAL CONTENTS: No intracranial hemorrhage, extraaxial collection, or mass effect.  No CT evidence of acute infarct. Normal parenchymal attenuation. Normal ventricles and sulci.      VISUALIZED ORBITS/SINUSES/MASTOIDS: No intraorbital abnormality. No significant paranasal sinus mucosal disease. No middle ear or mastoid effusion.     BONES/SOFT TISSUES: No acute abnormality.     HEAD CTA:  ANTERIOR CIRCULATION: No stenosis/occlusion, aneurysm, or high flow vascular malformation. 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     POSTERIOR CIRCULATION:  Severe proximal left P2 with tandem severe distal P2 stenosis. Distal flow seen. No occlusion, aneurysm, or high flow vascular malformation. Balanced vertebral arteries supply a normal basilar artery. Proximal basilar   fenestration.     DURAL VENOUS SINUSES: Expected enhancement of the major dural venous sinuses.     NECK CTA:  RIGHT CAROTID: No measurable stenosis or dissection.     LEFT CAROTID: No measurable stenosis or dissection.     VERTEBRAL ARTERIES: No focal stenosis or dissection. Balanced vertebral arteries.     AORTIC ARCH: Classic aortic arch anatomy with no significant stenosis at the origin of the great vessels.     NONVASCULAR STRUCTURES:  2.1 cm left thyroid nodule.                                                                      IMPRESSION:   HEAD CT:  1.  No acute intracranial process or significant change since 2037 hours.     HEAD CTA:   1.  No large vessel occlusion or proximal branch stenosis.  2.  Multifocal severe right P2 stenoses with distal flow present.     NECK CTA:  1.  No large vessel occlusion or hemodynamically significant stenosis.  2.  2.1 cm left thyroid nodule. Outpatient ultrasound is recommended to  further characterize.       Case was discussed with the stroke neurologist who recommended:       Preliminary labs done in the ER showed a CBC which was unremarkable.  CMP which was unremarkable.    Patient is being admitted for further TIA evaluation.  Patient had no major concerns when seen except that he wanted something to help him with claustrophobia brought to his MRI    Past Medical History    Hypertension  Hyperlipidemia  Type II DM  Problem Hypertension (I10)   Active confirmed   [id number]     Problem Hepatic steatosis (K76.0)   Active confirmed   [id number]     Problem Calculus of kidney (N20.0)   Active confirmed   [id number]     Problem Morbid (severe) obesity due to excess calories (E66.01)   Active confirmed   [id number]     Problem Retinal edema (H35.81)   Active confirmed   [id number]     Problem Type 2 diabetes mellitus with hyperglycemia, without long-term current use of insulin (E11.65)   Active confirmed   [id number]     Problem Type 2 diabetes mellitus without complication, without long-term current use of insulin (E11.9)           Past Surgical History   ?  Dental procedures    Prior to Admission Medications   None        Review of Systems    The 10 point Review of Systems is negative other than noted in the HPI or here.     Social History   I have reviewed this patient's social history and updated it with pertinent information if needed.   No history of smoking      Family History           Allergies   No Known Allergies     Physical Exam   Vital Signs:   Temp src: Oral BP: (!) 157/76 Pulse: 79   Resp: 20 SpO2: 95 % O2 Device: None (Room air)    Weight: 289 lbs 14.48 oz      General Aox3, appropriate affect, NAD, on RA, obese  HEENT  MMM, EOMI, PERRL  Chest Adeq E b/l, No wheezing  Heart RRR, No M/R/G  Abd- Soft, NT, BS+  - Deferred,   Extremity- Moving all extremities, No digital clubbing,   No edema  Neuro- Aox3, CN II- XII intact, No peripheral vision loss  P5/5, T-N, reflexes 2+, plantar reflex  downwards,  gait not checked  Skin  Has no tattoo, No skin rash     Medical Decision Making       85 MINUTES SPENT BY ME on the date of service doing chart review, history, exam, documentation & further activities per the note.      ------------------ MEDICAL DECISION MAKING ------------------------------------------------------------------------------------------------------  MANAGEMENT DISCUSSED with the following over the past 24 hours:         Data     I have personally reviewed the following data over the past 24 hrs:    9.5  \   16.4   / 208     141 103 11.4 /  246 (H)   3.5 28 0.80 \     Trop: 12 BNP: N/A     INR:  0.97 PTT:  29   D-dimer:  N/A Fibrinogen:  N/A       Imaging results reviewed over the past 24 hrs:   Recent Results (from the past 24 hour(s))   CT Head w/o Contrast    Narrative    For Patients: As a result of the 21st Century Cures Act, medical imaging exams and procedure reports are released immediately into your electronic medical record. You may view this report before your referring provider. If you have questions, please contact your health care provider.    EXAM: CT HEAD BRAIN WO  LOCATION: [facility]  DATE: 1/12/2024    INDICATION: Woke up with blurred vision for 10 minutes.  COMPARISON: None.  TECHNIQUE: Routine CT Head without IV contrast. Multiplanar reformats. Dose reduction techniques were used.    FINDINGS:  INTRACRANIAL CONTENTS: No intracranial hemorrhage, extraaxial collection, or mass effect.  No CT evidence of acute infarct. Normal parenchymal attenuation for age. Cerebellar tonsillar position is normal. Sella and suprasellar structures are normal. Normal ventricles and sulci for age.     VISUALIZED ORBITS/SINUSES/MASTOIDS: No intraorbital abnormality. No paranasal sinus mucosal disease. No middle ear or mastoid effusion.    BONES/SOFT TISSUES: No evidence for fracture of the calvarium or skull base. Mineralization overall is satisfactory. No swelling of the  facial or scalp soft tissues.    Impression    1.  No acute intracranial process.    2.  No intracranial mass, mass effect, or hemorrhage.    3.  Nothing for acute or evolving infarction.   CTA Head Neck with Contrast    Narrative    EXAM: CTA HEAD NECK W CONTRAST  LOCATION: [facility]  DATE/TIME: 1/12/2024 9:51 PM CST    INDICATION: Code stroke; blurred vision, left arm weakness  COMPARISON: Same day CT head 2037 hours  CONTRAST: 75 mL Isovue 370  TECHNIQUE: Head and neck CT angiogram with IV contrast. Noncontrast head CT followed by axial helical CT images of the head and neck vessels obtained during the arterial phase of intravenous contrast administration. Axial 2D reconstructed images and   multiplanar 3D MIP reconstructed images of the head and neck vessels were performed by the technologist. Dose reduction techniques were used. All stenosis measurements made according to NASCET criteria unless otherwise specified.    FINDINGS:   NONCONTRAST HEAD CT:   INTRACRANIAL CONTENTS: No intracranial hemorrhage, extraaxial collection, or mass effect.  No CT evidence of acute infarct. Normal parenchymal attenuation. Normal ventricles and sulci.     VISUALIZED ORBITS/SINUSES/MASTOIDS: No intraorbital abnormality. No significant paranasal sinus mucosal disease. No middle ear or mastoid effusion.    BONES/SOFT TISSUES: No acute abnormality.    HEAD CTA:  ANTERIOR CIRCULATION: No stenosis/occlusion, aneurysm, or high flow vascular malformation. 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    POSTERIOR CIRCULATION:  Severe proximal left P2 with tandem severe distal P2 stenosis. Distal flow seen. No occlusion, aneurysm, or high flow vascular malformation. Balanced vertebral arteries supply a normal basilar artery. Proximal basilar   fenestration.    DURAL VENOUS SINUSES: Expected enhancement of the major dural venous sinuses.    NECK CTA:  RIGHT CAROTID: No measurable stenosis  or dissection.    LEFT CAROTID: No measurable stenosis or dissection.    VERTEBRAL ARTERIES: No focal stenosis or dissection. Balanced vertebral arteries.    AORTIC ARCH: Classic aortic arch anatomy with no significant stenosis at the origin of the great vessels.    NONVASCULAR STRUCTURES:  2.1 cm left thyroid nodule.      Impression    IMPRESSION:   HEAD CT:  1.  No acute intracranial process or significant change since 2037 hours.    HEAD CTA:   1.  No large vessel occlusion or proximal branch stenosis.  2.  Multifocal severe right P2 stenoses with distal flow present.    NECK CTA:  1.  No large vessel occlusion or hemodynamically significant stenosis.  2.  2.1 cm left thyroid nodule. Outpatient ultrasound is recommended to further characterize.    Dr. Lujan was contacted by me on 1/12/2024 9:38 PM and 9:48 PM CST with the CT head and CTA preliminary report, respectively.   CT Head Perfusion w Contrast - For Tier 2 Stroke    Narrative    EXAM: CT HEAD PERFUSION W CONTRAST  LOCATION: [facility]  DATE: 1/12/2024    INDICATION: Code stroke; blurred vision, left arm weakness  COMPARISON: Same day CTs.  TECHNIQUE: CT cerebral perfusion was performed utilizing a second contrast bolus. Perfusion data were post processed with generation of standard perfusion maps and estimation of ischemic/infarcted volumes utilizing standard threshold values. Dose   reduction techniques were used.   CONTRAST: 50 mL Isovue 370    FINDINGS:   PERFUSION MAPS:  Subjectively there is elevated Tmax in the right PCA distribution. No focal deficits in cerebral blood flow or volume to suggest ischemia/oligemia.    RAPID ANALYSIS:  CBF<30% volume: 0 mL  Tmax>6sec: 0 mL  Mismatch volume: 0 mL  Mismatch ratio: None      Impression    IMPRESSION:   1.  No acute infarct or tissue at risk per RAPID analysis.  2.  Subjectively there is elevated Tmax in the right PCA distribution corresponding to known stenoses.

## 2024-01-13 NOTE — PROGRESS NOTES
Care Management Initial Consult    General Information  Assessment completed with:  patient and his wife, Kyra,         Primary Care Provider verified and updated as needed:   yes  Readmission within the last 30 days:   no        Advance Care Planning:  no documents       Communication Assessment  Patient's communication style: spoken language (English or Bilingual)    Hearing Difficulty or Deaf: no   Wear Glasses or Blind: yes    Cognitive  Cognitive/Neuro/Behavioral: WDL  Level of Consciousness: alert  Arousal Level: opens eyes spontaneously  Orientation: oriented x 4  Mood/Behavior: calm, cooperative  Best Language: 0 - No aphasia  Speech: clear, spontaneous    Living Environment:   People in home:   wife and 20y/o daughter   Current living Arrangements:        Able to return to prior arrangements:  yes       Family/Social Support:  Care provided by:  self  Provides care for:                  Description of Support System:           Current Resources:   Patient receiving home care services:  no  Community Resources:  none  Equipment currently used at home: none  Supplies currently used at home:  none    Employment/Financial:  Employment Status:  works from home 4 days/week        Financial Concerns:  no          Does the patient's insurance plan have a 3 day qualifying hospital stay waiver?  No    Lifestyle & Psychosocial Needs:  Social Determinants of Health     Food Insecurity: Not on file   Depression: Not on file   Housing Stability: Not on file   Tobacco Use: Not on file   Financial Resource Strain: Not on file   Alcohol Use: Not on file   Transportation Needs: Not on file   Physical Activity: Not on file   Interpersonal Safety: Not on file   Stress: Not on file   Social Connections: Not on file       Functional Status:  Prior to admission patient needed assistance: independent             Mental Health Status:no concerns          Chemical Dependency Status:no concerns                 Values/Beliefs:  Spiritual, Cultural Beliefs, Sikhism Practices, Values that affect care:   Oralia              Additional Information:  Patient is alert,oriented and independent. He expects to return home at discharge. Wife will transport. No needs identified    ROYAL Wheatley

## 2024-01-13 NOTE — PLAN OF CARE
"  Problem: Adult Inpatient Plan of Care  Goal: Plan of Care Review  Description: The Plan of Care Review/Shift note should be completed every shift.  The Outcome Evaluation is a brief statement about your assessment that the patient is improving, declining, or no change.  This information will be displayed automatically on your shift  note.  Outcome: Met  Goal: Patient-Specific Goal (Individualized)  Description: You can add care plan individualizations to a care plan. Examples of Individualization might be:  \"Parent requests to be called daily at 9am for status\", \"I have a hard time hearing out of my right ear\", or \"Do not touch me to wake me up as it startles  me\".  Outcome: Met  Goal: Absence of Hospital-Acquired Illness or Injury  Outcome: Met  Intervention: Identify and Manage Fall Risk  Recent Flowsheet Documentation  Taken 1/13/2024 1158 by Lety Almaguer RN  Safety Promotion/Fall Prevention:   clutter free environment maintained   nonskid shoes/slippers when out of bed   room organization consistent   safety round/check completed  Taken 1/13/2024 0756 by Lety Almaguer RN  Safety Promotion/Fall Prevention:   clutter free environment maintained   nonskid shoes/slippers when out of bed   room organization consistent   safety round/check completed  Intervention: Prevent Skin Injury  Recent Flowsheet Documentation  Taken 1/13/2024 1158 by Lety Almaguer RN  Body Position: sitting up in bed  Intervention: Prevent and Manage VTE (Venous Thromboembolism) Risk  Recent Flowsheet Documentation  Taken 1/13/2024 1158 by Lety Almaguer RN  VTE Prevention/Management: (Up ad greta) SCDs (sequential compression devices) off  Taken 1/13/2024 0756 by Lety Almaguer RN  VTE Prevention/Management: (Up ad greta) SCDs (sequential compression devices) off  Goal: Optimal Comfort and Wellbeing  Outcome: Met  Goal: Readiness for Transition of Care  Outcome: Met     Problem: Stroke, Ischemic (Includes Transient Ischemic Attack)  Goal: " Optimal Coping  Outcome: Met  Goal: Effective Bowel Elimination  Outcome: Met  Goal: Optimal Cerebral Tissue Perfusion  Outcome: Met  Goal: Optimal Cognitive Function  Outcome: Met  Goal: Improved Communication Skills  Outcome: Met  Goal: Optimal Functional Ability  Outcome: Met  Intervention: Optimize Functional Ability  Recent Flowsheet Documentation  Taken 1/13/2024 0756 by Lety Almaguer, RN  Activity Management: up ad greta  Goal: Optimal Nutrition Intake  Outcome: Met  Goal: Effective Oxygenation and Ventilation  Outcome: Met  Goal: Improved Sensorimotor Function  Outcome: Met  Goal: Safe and Effective Swallow  Outcome: Met  Goal: Effective Urinary Elimination  Outcome: Met   Goal Outcome Evaluation:       Patient denied pain prior to discharge. Scored 0 on NIH. Sinus tachycardia on telemetry. Up independently in room. Discharge instructions discussed with patient and wife; both verbalized understanding. Copy of AVS given to patient. Patient left with all personal belongings.

## 2024-01-13 NOTE — ED TRIAGE NOTES
Pt arrives to room with c/o blurry vision that started peripherally and then became more central, left arm and leg weakness around 7pm. Pt was sitting in chair when this happened. Pt states all symptoms have resolved now. Tier 2 stroke called 2123, Pt to CT. Labs already sent.  HX: diabetes     Triage Assessment (Adult)       Row Name 01/12/24 2128          Triage Assessment    Airway WDL WDL        Respiratory WDL    Respiratory WDL WDL        Skin Circulation/Temperature WDL    Skin Circulation/Temperature WDL WDL        Cardiac WDL    Cardiac WDL X  hypertensive        Peripheral/Neurovascular WDL    Peripheral Neurovascular WDL WDL        Cognitive/Neuro/Behavioral WDL    Cognitive/Neuro/Behavioral WDL WDL        Edis Coma Scale    Best Eye Response 4-->(E4) spontaneous     Best Motor Response 6-->(M6) obeys commands     Best Verbal Response 5-->(V5) oriented     [place] Coma Scale Score 15

## 2024-01-13 NOTE — PROGRESS NOTES
Pt is not appropriate for skilled PT services at this time due to pt report no concerns about mobility or balance. Pt. has been moving in independently in room and feels good, pt would like to decline therapy services at this time.   Plan was discussed with RN, OT.  Will D/C current PT orders.  Please reorder if status changes. Thank you.    1/13/2024 by Marlene Shepherd,PT

## 2024-01-13 NOTE — PLAN OF CARE
Speech Language Pathology: Orders received. Chart reviewed and discussed with RN.? Speech Language Pathology not indicated due to no concerns with speech or swallowing per RN. Per EMR review pt scoring 0 on NIHHS, RN reports no concerns with swallow and she was about to page provider for diet orders. No indication for acute speech therapy at this time.? Defer discharge recommendations to PT/OT/MD.? Will complete orders.

## 2024-01-13 NOTE — CONSULTS
"  [facility]    Stroke Telephone Note    I was called by Ish Lujan on 01/12/24 regarding patient Douglas Stephenson. The patient is a 53 year old man who presented with 20 minutes of left sided weakness and bitemporal vision loss. NIHSS 0 at ED.  .    Vitals  BP: 132/74   Pulse: 81   Resp: 20   Temp: 98.2  F (36.8  C)   Weight: 131.8 kg (290 lb 9.1 oz)    Imaging Findings  CT head: No acute ischemia/hemorrhage  CTA head/neck: No LVO. Severe R P1 stenosis and moderate R P2 stenosis    Impression  - Transient neurological deficits (left sided weakness, visual disturbances)  - R PCA stenosis    Concern for TIA vs small stroke    Recommendations  - Use orderset: \"Ischemic Stroke Routine Admission\" or \"Ischemic Stroke No Thrombolytics/No Thrombectomy ICU Admission\"  - Neurochecks and Vital Signs every 4 hours   - Permissive HTN; goal SBP < 220 mmHg  - Daily aspirin 325 mg for secondary stroke prevention  - Plavix (clopidogrel) 300 mg PO loading dose x 1  - Plavix (clopidogrel) 75 mg PO Daily  - Statin: per lipid profile results: LDL goal < 70  - MRI Brain with and without contrast  - TTE (with Bubble Study if age 60 yrs or less)  - Telemetry, EKG  - Bedside Glucose Monitoring  - A1c, Lipid Panel, Troponin x 3  - PT/OT/SLP  - Stroke Education  - Euthermia, Euglycemia    My recommendations are based on the information provided over the phone by Douglas Stephenson's in-person providers. They are not intended to replace the clinical judgment of his in-person providers. I was not requested to personally see or examine the patient at this time.     The Stroke Staff is Dr. Griffith.    Maria Luisa Barillas MD  Vascular Neurology Fellow    To page me or covering stroke neurology team member, click here: [facility]  Choose \"On Call\" tab at top, then select \"NEUROLOGY/ALL SITES\" from middle drop-down box, press Enter, then look for \"stroke\" or \"telestroke\" for your site.   "

## 2024-01-13 NOTE — ED PROVIDER NOTES
EMERGENCY DEPARTMENT ENCOUNTER       ED Course & Medical Decision Making     9:14 PM I met with the patient, obtained history, performed an initial exam, and discussed options and plan for diagnostics and treatment here in the ED.  9:23 PM Called Tier 2 stroke code.  9:28 PM Spoke to Stroke Neuro  9:38 PM Spoke to Radiology, non-con negative, unchanged from prior  9:47 PM Spoke to Radiology, CTA negative for LVO. Some severe right PCA stenosis in P2, but fills distially.  9:50 PM Deescalate tier 2 stroke code.  10:40 PM Patient updated on plan for admission, BP spontaneously improved 157/76  11:09 PM Spoke with Dr Bautista (hospitalist)    Final Impression  53 year old male presents for evaluation with sounds like likely TIA versus stroke.  Patient fell asleep for a nap in his chair at about 6:30 PM, woke up at about 7 PM noting that he had some bilateral temporal vision loss as well as blurriness in the central visual field and some left arm greater than left leg weakness, symptoms lasted about 15 minutes, states that they resolved on their own when he went outside into the cool air.  Initially presented to the urgent care, had a CT head without contrast there that was normal and was sent over here for further stroke evaluation.  On my initial evaluation patient ambulatory, NIH stroke score of 0, able to transfer self, normal symmetrical strength and sensation throughout extremities and face.  CTA negative.  Discussed with stroke neurology, they recommended Plavix load as he had already gone 325 mg aspirin at the urgent care.  Stroke neurology also recommended admission to the hospital for MRI tomorrow, as well as further stroke workup and evaluation.  Patient initially hypertensive in the 200s, though improved spontaneously to the 150s-160s without intervention.  Patient remained symptom-free while in the ED.  Will be admitted for stroke evaluation and MRI.    Prior to making a final disposition on this patient the  results of patient's tests and other diagnostic studies were discussed with the patient. All questions were answered. Patient expressed understanding of the plan and was amenable to it.    Medical Decision Making    History:  Supplemental history from: wife    Work Up:  Chart documentation includes differential considered and any EKGs or imaging independently interpreted by provider, where specified.  DDx considered but not limited to: Acute ischemic stroke, acute neurologic stroke, TIA, hypoglycemia    External consultation:  Discussion of management with another provider: Stroke neurology, hospitalist, radiology    Complicating factors:  Care impacted by chronic illness: Diabetes, Hyperlipidemia, and Hypertension  Care affected by social determinants of health: N/A    Disposition considerations: Admit.    Medications   clopidogrel (PLAVIX) tablet 300 mg (has no administration in time range)       New Prescriptions    No medications on file     Final Impression     1. TIA (transient ischemic attack)    2. Stroke-like symptoms      Critical Care     Performed by: Ish Lujan MD  Authorized by: Ish Lujan MD                   Total critical care time: 30 minutes  Critical care was necessary to treat or prevent imminent or life-threatening deterioration of the following conditions: Like symptoms  Critical care was time spent personally by me on the following activities: development of treatment plan with patient or surrogate, discussions with consultants, examination of patient, evaluation of patient's response to treatment, obtaining history from patient or surrogate, ordering and performing treatments and interventions, ordering and review of laboratory studies, ordering and review of radiographic studies, re-evaluation of patient's condition and monitoring for potential decompensation.  Critical care time was exclusive of separately billable procedures and treating other patients.    Chief  "Complaint     Chief Complaint   Patient presents with    Stroke Symptoms     HPI     Douglas Stephenson is a 53 year old male with a medical history of hypertension, diabetes who presents for evaluation of vision changes.    Per chart review, patient was seen at the urgency room for blurred vision on 1/12. Patient reported he woke up with blurred vision after a nap. Stated it was like \"looking through a ceiling fan\". The symptoms were pronounced enough that he states he had difficulty bringing his spoon to his mouth to eat dinner; couldn't see the bowl, the spoon. This episode of blurry vision lasted 10-15 minutes. He also endorses a weak sensation in his left arm that onset while he was walking around. His  strength feels weaker on the left. Patient additionally notes a \"twinge\" in his left leg Noted headache and left arm pain. CT of the head without any acute intracranial abnormality. However, given the patient's intermittent vision loss, left arm weakness, and left-sided leg symptoms, there is concern for possible stroke. Patient sent to the ED for comprehensive work-up.    Patient reports he felt normal taking a nap around 6:30 pm but after waking up around 7:00 pm, he began having left eye discomfort. He states it was like \"looking through a ceiling fan\" from his peripheral vision.  He then had an episode of left arm weakness and a \"tinged\" sensation in his left upper thigh. He notes he was sitting upright in a recliner chair while napping. Patient reports a similar episode of vision changes in the past in which his blood vessel in his eyes \"blew\" and he was given \"shots\" in his eyes. His vision returned back to normal 15 minutes after onset, as he was walking to his car. He reports the \"fresh air thrown in my face cleared my vision up\". The episode of weakness in his arm and leg lasted for 10-15 minutes then spontaneously resolved on its own. He was ambulatory throughout all of this. He was seen at the " "urgency room but they were concerned he was having a stroke and redirected to the ED. His head CT there was negative.     In the ED, he reports he feels \"perfectly fine\" now. His symptoms have resolved upon arriving. He had a headache at the urgency room that improved after receiving aspirin there. He denies a history of a stroke. Patient reports a history of high blood pressure which is controlled. He is a non-smoker.    I, Lucia Skelton am serving as a scribe to document services personally performed by Dr. Ish Lujan MD, based on my observation and the provider's statements to me. I, Dr. Ish Lujan MD attest that Lucia Skelton is acting in a scribe capacity, has observed my performance of the services and has documented them in accordance with my direction.    Physical Exam     BP (!) 195/105   Pulse 94   Resp 20   Ht 1.854 m (6' 1\")   Wt 131.5 kg (289 lb 14.5 oz)   SpO2 96%   BMI 38.25 kg/m    Constitutional: Awake, alert, in no acute distress  Head: Normocephalic, atraumatic.  ENT: Uvula midline, mucous membranes moist.  Eyes: PERRL, EOMI  Respiratory: Respirations even, unlabored. Lungs clear to ascultation bilaterally, in no acute respiratory distress.  Cardiovascular: Regular rate and rhythm. +2 radial pulses, equal bilaterally.   GI: Abdomen soft, non-tender  Musculoskeletal: Moves all 4 extremities equally, strength symmetrical on bilateral uppers and lowers.  Integument: Warm, dry. No rashes.  Neurologic:   General: Alert and oriented x3, no focal neuro deficits  Strength: RUE 5/5, LUE 5/5, RLE 5/5, LLE 5/5  Sensation: RUE wnl, LUE wnl, RLE wnl, LLE wnl  Gait: Ambulates independently with a steady gait, able to transfer self to gurney, help undress self  Speech: No apparent expressive or receptive aphasia  Psychiatric: Normal mood and affect. Normal judgement.    The patient has stroke symptoms:         ED Stroke specific documentation           NIHSS PDF       Thrombolytics:   Not given " due to:   - minor/isolated/quickly resolving symptoms    If treating with thrombolytics: Ensure SBP<180 and DBP<105 prior to treatment with thrombolytics.  Administering thrombolytics after treatment with IV labetalol, hydralazine, or nicardipine is reasonable once BP control is established.    Endovascular Retrieval:  Not initiated due to absence of proximal vessel occlusion    National Institutes of Health Stroke Scale (Baseline)  Time Performed: arrival     Score    Level of consciousness: (0)   Alert, keenly responsive    LOC questions: (0)   Answers both questions correctly    LOC commands: (0)   Performs both tasks correctly    Best gaze: (0)   Normal    Visual: (0)   No visual loss    Facial palsy: (0)   Normal symmetrical movements    Motor arm (left): (0)   No drift    Motor arm (right): (0)   No drift    Motor leg (left): (0)   No drift    Motor leg (right): (0)   No drift    Limb ataxia: (0)   Absent    Sensory: (0)   Normal- no sensory loss    Best language: (0)   Normal- no aphasia    Dysarthria: (0)   Normal    Extinction and inattention: (0)   No abnormality        Total Score:   0        Stroke Mimics were considered (including migraine headache, seizure disorder, hypoglycemia (or hyperglycemia), head or spinal trauma, CNS infection, Toxin ingestion and shock state (e.g. sepsis) .    Labs & Imaging     Results for orders placed or performed during the hospital encounter of 01/12/24   Basic metabolic panel   Result Value Ref Range    Sodium 141 135 - 145 mmol/L    Potassium 3.5 3.4 - 5.3 mmol/L    Chloride 103 98 - 107 mmol/L    Carbon Dioxide (CO2) 28 22 - 29 mmol/L    Anion Gap 10 7 - 15 mmol/L    Urea Nitrogen 11.4 6.0 - 20.0 mg/dL    Creatinine 0.80 0.67 - 1.17 mg/dL    GFR Estimate >90 >60 mL/min/1.73m2    Calcium 9.8 8.6 - 10.0 mg/dL    Glucose 246 (H) 70 - 99 mg/dL   Result Value Ref Range    INR 0.97 0.85 - 1.15   Partial thromboplastin time   Result Value Ref Range    aPTT 29 22 - 38 Seconds    Result Value Ref Range    Troponin T, High Sensitivity 12 <=22 ng/L   CBC with platelets and differential   Result Value Ref Range    WBC Count 9.5 4.0 - 11.0 10e3/uL    RBC Count 5.22 4.40 - 5.90 10e6/uL    Hemoglobin 16.4 13.3 - 17.7 g/dL    Hematocrit 46.7 40.0 - 53.0 %    MCV 90 78 - 100 fL    MCH 31.4 26.5 - 33.0 pg    MCHC 35.1 31.5 - 36.5 g/dL    RDW 13.0 10.0 - 15.0 %    Platelet Count 208 150 - 450 10e3/uL    % Neutrophils 61 %    % Lymphocytes 29 %    % Monocytes 7 %    % Eosinophils 2 %    % Basophils 1 %    % Immature Granulocytes 0 %    NRBCs per 100 WBC 0 <1 /100    Absolute Neutrophils 5.8 1.6 - 8.3 10e3/uL    Absolute Lymphocytes 2.7 0.8 - 5.3 10e3/uL    Absolute Monocytes 0.6 0.0 - 1.3 10e3/uL    Absolute Eosinophils 0.2 0.0 - 0.7 10e3/uL    Absolute Basophils 0.1 0.0 - 0.2 10e3/uL    Absolute Immature Granulocytes 0.0 <=0.4 10e3/uL    Absolute NRBCs 0.0 10e3/uL   Glucose by meter   Result Value Ref Range    GLUCOSE BY METER POCT 226 (H) 70 - 99 mg/dL       EKG     EKG reviewed and independently interpreted as below;  71, rate 82.  .  .  QTc 464.  No STEMI.  T wave inversion in lead III, aVF.  No priors available for comparison at this time.     Ish Lujan MD  01/12/24 [id number]

## 2024-01-13 NOTE — PLAN OF CARE
Problem: Stroke, Ischemic (Includes Transient Ischemic Attack)  Goal: Optimal Functional Ability  Intervention: Optimize Functional Ability  Recent Flowsheet Documentation  Taken 1/13/2024 0547 by Luis Ruelas, RN  Activity Management:   back to bed   ambulated to bathroom   Goal Outcome Evaluation:       0145 Pt admitted to room 125 from ED. Alert and oriented x4.  Ambulate independently to the bathroom.NPO. IVF of Normal Saline infusing at 75ml/hr. Sinus Rhythm on tele. Scoring 0 on NIHHS.  and 162. Denies any pain. VSS on RA.

## 2024-01-13 NOTE — PROGRESS NOTES
[facility] Hospitalist Progress Note  [facility]  Admission date: 1/12/2024    Brief progress note, full note to follow  Likely small vessel TIA in 53M with DM2, HTN.  Now back to normal.    #TIA -   -ECHO and neurology eval pending.    -asa 81, plavix 75 x 3 weeks  -lipitor  -secondary prevention.  Needs better DM and HTN control.    #DM2 - A1c 8s.  Resume metformin, start glipizide for now.  Increase BG checks to TID from qday.   I think would be a good candidate for GLP1a (I.e ozempic).  Is in discussion with PCP for this and will follow-up.    #HTN - resume hydrochlorothiazide, losartan.  Goal over time to be < 130/80.  Has monitor at home.    #Thyroid nodule - outpatient follow-up.   Pt aware of this.    #Morbid obesity - Body mass index is 38.34 kg/m .    #?DEJA - sleep med referral.      Vince Duarte MD, [facility]  Internal Medicine Hospitalist

## 2024-01-22 ENCOUNTER — LAB REQUISITION (OUTPATIENT)
Dept: LAB | Facility: CLINIC | Age: 54
End: 2024-01-22

## 2024-01-22 DIAGNOSIS — Z09 ENCOUNTER FOR FOLLOW-UP EXAMINATION AFTER COMPLETED TREATMENT FOR CONDITIONS OTHER THAN MALIGNANT NEOPLASM: ICD-10-CM

## 2024-01-22 DIAGNOSIS — E11.65 TYPE 2 DIABETES MELLITUS WITH HYPERGLYCEMIA (H): ICD-10-CM

## 2024-01-22 PROCEDURE — 80061 LIPID PANEL: CPT | Performed by: STUDENT IN AN ORGANIZED HEALTH CARE EDUCATION/TRAINING PROGRAM

## 2024-01-22 PROCEDURE — 80053 COMPREHEN METABOLIC PANEL: CPT | Performed by: STUDENT IN AN ORGANIZED HEALTH CARE EDUCATION/TRAINING PROGRAM

## 2024-01-23 LAB
ALBUMIN SERPL BCG-MCNC: 4.4 G/DL (ref 3.5–5.2)
ALP SERPL-CCNC: 88 U/L (ref 40–150)
ALT SERPL W P-5'-P-CCNC: 49 U/L (ref 0–70)
ANION GAP SERPL CALCULATED.3IONS-SCNC: 11 MMOL/L (ref 7–15)
AST SERPL W P-5'-P-CCNC: 33 U/L (ref 0–45)
BILIRUB SERPL-MCNC: 0.7 MG/DL
BUN SERPL-MCNC: 7.6 MG/DL (ref 6–20)
CALCIUM SERPL-MCNC: 9.5 MG/DL (ref 8.6–10)
CHLORIDE SERPL-SCNC: 106 MMOL/L (ref 98–107)
CHOLEST SERPL-MCNC: 114 MG/DL
CREAT SERPL-MCNC: 0.81 MG/DL (ref 0.67–1.17)
DEPRECATED HCO3 PLAS-SCNC: 25 MMOL/L (ref 22–29)
EGFRCR SERPLBLD CKD-EPI 2021: >90 ML/MIN/1.73M2
FASTING STATUS PATIENT QL REPORTED: NORMAL
GLUCOSE SERPL-MCNC: 96 MG/DL (ref 70–99)
HDLC SERPL-MCNC: 47 MG/DL
LDLC SERPL CALC-MCNC: 40 MG/DL
NONHDLC SERPL-MCNC: 67 MG/DL
POTASSIUM SERPL-SCNC: 3.6 MMOL/L (ref 3.4–5.3)
PROT SERPL-MCNC: 7.6 G/DL (ref 6.4–8.3)
SODIUM SERPL-SCNC: 142 MMOL/L (ref 135–145)
TRIGL SERPL-MCNC: 134 MG/DL